# Patient Record
(demographics unavailable — no encounter records)

---

## 2024-11-19 NOTE — HISTORY OF PRESENT ILLNESS
[postmenopausal] : postmenopausal [Y] : Positive pregnancy history [Currently Active] : currently active [Men] : men [Vaginal] : vaginal [Mammogramdate] : 2023 [TextBox_19] : BHAVIK [BreastSonogramDate] : 2023 [PapSmeardate] : 2023 [BoneDensityDate] : 2022 [TextBox_37] : BHAVIK [ColonoscopyDate] : 2018 [PGHxTotal] : 2 [[facility]] : 2 [[facility]] : 2 [FreeTextEntry1] :

## 2025-02-14 NOTE — HISTORY OF PRESENT ILLNESS
[FreeTextEntry1] : 56 yr old had episode of hematuria with slight discomfort. Saw primary and was originally put on macrobid. Culture was negative. Father recently passed away and patient had been lifting him while he was in rehab. She was concerned that she may have pulled a muscle. No further hematuria except that one instance. Had pelvic sono all normal. Had renal scan reported as normal. Fells ok now. Discussed urology consult

## 2025-02-27 NOTE — PHYSICAL EXAM
[Atrophy] : atrophy [Normal] : normal [Uterine Adnexae] : normal [FreeTextEntry1] : severe erythema outside extending to rectum [FreeTextEntry4] : no discharge seen

## 2025-02-27 NOTE — HISTORY OF PRESENT ILLNESS
[FreeTextEntry1] : Patient returns with severe vaginal discomfort outside and inside. Scant discharge. No bleeding. No dysuria. No further hematuria. Patient reports using sex toy since December and is wondering if she could have an allergic reaction

## 2025-02-27 NOTE — PLAN
[FreeTextEntry1] : Check vaginal cultures Check urine culture Stop using toy Benadryl for possible allergic reaction Clobetasol for exterior.  Vit E supp for atrophic vaginitis. Will follow up if not better